# Patient Record
Sex: FEMALE | Race: WHITE | Employment: UNEMPLOYED | URBAN - METROPOLITAN AREA
[De-identification: names, ages, dates, MRNs, and addresses within clinical notes are randomized per-mention and may not be internally consistent; named-entity substitution may affect disease eponyms.]

---

## 2022-09-01 ENCOUNTER — OFFICE VISIT (OUTPATIENT)
Dept: ORTHOPEDIC SURGERY | Age: 42
End: 2022-09-01
Payer: COMMERCIAL

## 2022-09-01 VITALS — WEIGHT: 210 LBS | BODY MASS INDEX: 31.83 KG/M2 | HEIGHT: 68 IN

## 2022-09-01 DIAGNOSIS — M25.531 RIGHT WRIST PAIN: Primary | ICD-10-CM

## 2022-09-01 DIAGNOSIS — G56.01 ACUTE CARPAL TUNNEL SYNDROME OF RIGHT WRIST: ICD-10-CM

## 2022-09-01 PROCEDURE — 99203 OFFICE O/P NEW LOW 30 MIN: CPT | Performed by: ORTHOPAEDIC SURGERY

## 2022-09-01 RX ORDER — IBUPROFEN 600 MG/1
TABLET ORAL
COMMUNITY
Start: 2022-07-20

## 2022-09-01 RX ORDER — ROSUVASTATIN CALCIUM 10 MG/1
TABLET, COATED ORAL
COMMUNITY
Start: 2022-06-18

## 2022-09-01 RX ORDER — CHLORTHALIDONE 25 MG/1
TABLET ORAL
COMMUNITY
Start: 2022-08-16

## 2022-09-01 RX ORDER — DULOXETIN HYDROCHLORIDE 60 MG/1
CAPSULE, DELAYED RELEASE ORAL
COMMUNITY
Start: 2022-08-01

## 2022-09-01 RX ORDER — OMEPRAZOLE 40 MG/1
40 CAPSULE, DELAYED RELEASE ORAL DAILY
COMMUNITY
Start: 2021-12-16

## 2022-09-01 RX ORDER — OXYCODONE HYDROCHLORIDE 5 MG/1
TABLET ORAL
COMMUNITY
Start: 2022-07-20

## 2022-09-01 RX ORDER — OXCARBAZEPINE 150 MG/1
TABLET, FILM COATED ORAL
COMMUNITY
Start: 2022-08-22

## 2022-09-01 RX ORDER — VERAPAMIL HYDROCHLORIDE 360 MG/1
CAPSULE, DELAYED RELEASE PELLETS ORAL
COMMUNITY
Start: 2022-08-22

## 2022-09-01 RX ORDER — NARATRIPTAN 2.5 MG/1
TABLET ORAL
COMMUNITY
Start: 2022-07-24

## 2022-09-01 RX ORDER — SPIRONOLACTONE 50 MG/1
50 TABLET, FILM COATED ORAL 2 TIMES DAILY
COMMUNITY
Start: 2021-12-16

## 2022-09-01 RX ORDER — METHYLPREDNISOLONE 4 MG/1
TABLET ORAL
Qty: 1 KIT | Refills: 0 | Status: SHIPPED | OUTPATIENT
Start: 2022-09-01 | End: 2022-09-29 | Stop reason: ALTCHOICE

## 2022-09-01 RX ORDER — SIMETHICONE 125 MG
CAPSULE ORAL ONCE
COMMUNITY

## 2022-09-01 RX ORDER — DOXAZOSIN 2 MG/1
TABLET ORAL
COMMUNITY
Start: 2022-08-16

## 2022-09-01 RX ORDER — TRIAMCINOLONE ACETONIDE 1 MG/G
CREAM TOPICAL
COMMUNITY
Start: 2022-05-16

## 2022-09-01 RX ORDER — ACETAMINOPHEN 325 MG/1
650 TABLET ORAL EVERY 6 HOURS PRN
COMMUNITY
Start: 2022-07-20

## 2022-09-01 NOTE — PROGRESS NOTES
Yumiko Deluna is seen today for evaluation of right wrist pain. She has some intermittent right wrist pain over the last 2 years associated with computer work. She wears an over-the-counter carpal tunnel brace at night and that seems to help. Starting couple of weeks ago she began having more severe pain that would be as bad as 8 or 9 out of 10 with some activities such as washing her hands. She has pain on the palmar and ulnar aspect of the hand at night she gets numbness and tingling in her fingers. Ibuprofen seems to help. She takes Cymbalta for migraines. She is right-hand dominant but writes with her left hand. 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 She denies trauma. History: Patient's relevant past family, medical, and social history are reviewed as part of today's visit. ROS of pertinent positives and negatives as above; otherwise negative. General Exam:    Vitals: Height 5' 8\" (1.727 m), weight 210 lb (95.3 kg). Constitutional: Patient is adequately groomed with no evidence of malnutrition  Mental Status: The patient is oriented to time, place and person. The patient's mood and affect are appropriate. Gait:  Patient walks with normal gait and station. Lymphatic: The lymphatic examination bilaterally reveals all areas to be without enlargement or induration. Vascular: Examination reveals no swelling or calf tenderness. Peripheral pulses are palpable and 2+. Neurological: The patient has good coordination. There is no weakness or sensory deficit. Skin:    Head/Neck: inspection reveals no rashes, ulcerations or lesions. Trunk:  inspection reveals no rashes, ulcerations or lesions. Right Lower Extremity: inspection reveals no rashes, ulcerations or lesions. Left Lower Extremity: inspection reveals no rashes, ulcerations or lesions. Left wrist exam is benign. She has normal sensation with brisk capillary refill.   I cannot provoke any level of discomfort with range of motion, , or palpation. Right wrist has some mild discomfort over the ulnar aspect. Tinel's and Phalen's are negative median nerve compression is negative. She has normal flexor and extensor tendons with normal range of motion. She has brisk capillary refill. Three-view x-rays right wrist obtained today in the office and interpreted by me demonstrate:No   Bony abnormalities    Assessment: Nonspecific right wrist pain with complaints consistent with carpal tunnel although physical exam was unconvincing. Plan: We are going to start with a Medrol Dosepak, I am recommending she wear a carpal tunnel brace for most activities including sleep and we will obtain an EMG. Follow-up with me after the EMG.

## 2022-09-29 ENCOUNTER — OFFICE VISIT (OUTPATIENT)
Dept: ORTHOPEDIC SURGERY | Age: 42
End: 2022-09-29
Payer: COMMERCIAL

## 2022-09-29 VITALS — BODY MASS INDEX: 31.83 KG/M2 | WEIGHT: 210 LBS | HEIGHT: 68 IN

## 2022-09-29 DIAGNOSIS — M25.531 RIGHT WRIST PAIN: Primary | ICD-10-CM

## 2022-09-29 PROCEDURE — 99212 OFFICE O/P EST SF 10 MIN: CPT | Performed by: ORTHOPAEDIC SURGERY

## 2022-09-29 RX ORDER — MELOXICAM 15 MG/1
15 TABLET ORAL DAILY
Qty: 30 TABLET | Refills: 1 | Status: SHIPPED | OUTPATIENT
Start: 2022-09-29 | End: 2022-10-29

## 2022-09-29 NOTE — PROGRESS NOTES
Gustavo Baig returns today to follow-up her right wrist.  Medrol Dosepak was tremendously helpful. She is usually pain-free but sometimes has pain that is 2 or 3 out of 10. We obtained her EMG as well. General Exam:    Vitals: Height 5' 8\" (1.727 m), weight 210 lb (95.3 kg). Constitutional: Patient is adequately groomed with no evidence of malnutrition  Mental Status: The patient is oriented to time, place and person. The patient's mood and affect are appropriate. Right wrist today has some trace ulnar discomfort but no pain radially no pain on the palmar or dorsal surface. She has no pain with range of motion and no pain with . She has brisk capillary refill with normal sensation and normal radial, median, and ulnar nerve function. EMG of the right upper extremity is reviewed and it shows no evidence of neuropathy, plexopathy, or radiculopathy. Assessment: Non specific but improving right wrist pain. Plan: Prescription for meloxicam was provided. She will not mix this with ibuprofen or Aleve. She will take it with food. Hopefully this will provide complete resolution. Follow-up with me on an as-needed basis.

## 2022-12-03 DIAGNOSIS — M25.531 RIGHT WRIST PAIN: ICD-10-CM

## 2022-12-05 RX ORDER — MELOXICAM 15 MG/1
15 TABLET ORAL DAILY
Qty: 30 TABLET | Refills: 1 | Status: SHIPPED | OUTPATIENT
Start: 2022-12-05 | End: 2023-01-04

## 2023-02-10 DIAGNOSIS — M25.531 RIGHT WRIST PAIN: ICD-10-CM

## 2023-02-10 RX ORDER — MELOXICAM 15 MG/1
15 TABLET ORAL DAILY
Qty: 30 TABLET | Refills: 1 | OUTPATIENT
Start: 2023-02-10 | End: 2023-03-12

## 2023-12-18 NOTE — PROGRESS NOTES
Maria M Pendleton    Age 44 y.o.    female    1980    MRN [id number]    12/27/2023  Arrival Time_____________  OR Time____________30 Georjean Millin     Procedure(s):  ESOPHAGOGASTRODUODENOSCOPY                      General    Surgeon(s):  Arianne Moralez, MD       Phone [phone] ([place])     [facility]  Cell         Work  _____________________________________________________________________  _____________________________________________________________________  _____________________________________________________________________  _____________________________________________________________________  _____________________________________________________________________    PCP _____________________________ Phone_________________     H&P  ________________  Bringing      Chart              Epic      DOS      Called________  EKG ________________   Bringing      Chart              Epic      DOS      Called________  LABS________________   Bringing     Chart              Epic      DOS      Called________  Cardiac Clearance ______ Bringing      Chart              Epic      DOS      Called________  Pulmonary Clearance____ Bringing      Chart              Epic      DOS      Called________    Cardiologist________________________ Phone___________________________  Pulmonologist_______________________Phone___________________________    ? Advance Directives   ? Christian concerns / Waiver on Chart            PAT Communications________________  ? Pre-op Instructions Given [address]          _________________________________  ? Directions to Surgery Center                          _________________________________  ? Transportation Home_______________      __________________________________  ?  Crutches/Walker__________________        __________________________________    ________Pre-op Orders   _______Transcribed    _______Wt.  ________Pharmacy          _______SCD       ______SHANA Oviedo

## 2023-12-20 NOTE — PROGRESS NOTES
Date and time of surgery :12/27/23              Arrival Time:  0800     Bring Picture ID and insurance card. Please wear simple, loose fitting clothing to the hospital.   [place] not bring valuables (money, credit cards, checkbooks, etc.)   Do not wear any makeup (including  eye makeup) and no nail polish or artificial nails on your fingers or toes. DO NOT wear any jewelry or piercings on day of surgery. All body piercing jewelry must be removed. If you have dentures, they will be removed before going to the OR; we will provide you a container. If you wear contact lenses or glasses, they will be removed; please bring a case for them. Shower the evening before or morning of surgery with antibacterial soap. Nothing to eat or drink after midnight the day before surgery. You may brush your teeth and gargle the morning of surgery. DO NOT SWALLOW WATER. Do not take any morning meds the day of your surgery. Aspirin, Ibuprofen, Advil, Naproxen, Vitamin E and other Anti-inflammatory products and supplements should be stopped for 5 -7days before surgery or as directed by your physician. Do not smoke or drink any alcoholic beverages 24 hours prior to surgery. This includes NA Beer. Refrain from the usage of any recreational drugs, including non-prescribed prescription drugs. You MUST plan for a responsible adult to stay on site while you are here and take you home after your surgery. You will not be allowed to leave alone or drive yourself home. It is strongly suggested someone stay with you the first 24 hrs. Your surgery will be cancelled if you do not have a ride home. To help prevent infection, change your sheets the night before surgery. If you  have a Living Will and Durable Power of  for Healthcare, please bring in a copy. Notify your Surgeon if you develop any illness between now and time of surgery.  Cough, cold, fever, sore throat, nausea, vomiting, etc.  Please notify your surgeon if you

## 2023-12-26 ENCOUNTER — ANESTHESIA EVENT (OUTPATIENT)
Dept: ENDOSCOPY | Age: 43
End: 2023-12-26
Payer: COMMERCIAL

## 2023-12-27 ENCOUNTER — ANESTHESIA (OUTPATIENT)
Dept: ENDOSCOPY | Age: 43
End: 2023-12-27
Payer: COMMERCIAL

## 2023-12-27 ENCOUNTER — HOSPITAL ENCOUNTER (OUTPATIENT)
Age: 43
Setting detail: OUTPATIENT SURGERY
Discharge: HOME | End: 2023-12-27
Attending: INTERNAL MEDICINE | Admitting: INTERNAL MEDICINE
Payer: COMMERCIAL

## 2023-12-27 VITALS
BODY MASS INDEX: 31.84 KG/M2 | OXYGEN SATURATION: 97 % | DIASTOLIC BLOOD PRESSURE: 77 MMHG | TEMPERATURE: 97 F | WEIGHT: 215 LBS | HEART RATE: 70 BPM | RESPIRATION RATE: 18 BRPM | HEIGHT: 69 IN | SYSTOLIC BLOOD PRESSURE: 130 MMHG

## 2023-12-27 DIAGNOSIS — K31.84 GASTROPARESIS: Primary | ICD-10-CM

## 2023-12-27 PROCEDURE — 7100000010 HC PHASE II RECOVERY - FIRST 15 MIN: Performed by: INTERNAL MEDICINE

## 2023-12-27 PROCEDURE — 2500000003 HC RX 250 WO HCPCS: Performed by: NURSE ANESTHETIST, CERTIFIED REGISTERED

## 2023-12-27 PROCEDURE — 3700000001 HC ADD 15 MINUTES (ANESTHESIA): Performed by: INTERNAL MEDICINE

## 2023-12-27 PROCEDURE — 7100000011 HC PHASE II RECOVERY - ADDTL 15 MIN: Performed by: INTERNAL MEDICINE

## 2023-12-27 PROCEDURE — 2709999900 HC NON-CHARGEABLE SUPPLY: Performed by: INTERNAL MEDICINE

## 2023-12-27 PROCEDURE — 6360000002 HC RX W HCPCS: Performed by: NURSE ANESTHETIST, CERTIFIED REGISTERED

## 2023-12-27 PROCEDURE — 3609017100 HC EGD: Performed by: INTERNAL MEDICINE

## 2023-12-27 PROCEDURE — 3700000000 HC ANESTHESIA ATTENDED CARE: Performed by: INTERNAL MEDICINE

## 2023-12-27 PROCEDURE — 2580000003 HC RX 258: Performed by: ANESTHESIOLOGY

## 2023-12-27 RX ORDER — SODIUM CHLORIDE 0.9 % (FLUSH) 0.9 %
5-40 SYRINGE (ML) INJECTION EVERY 12 HOURS SCHEDULED
Status: DISCONTINUED | OUTPATIENT
Start: 2023-12-27 | End: 2023-12-27 | Stop reason: HOSPADM

## 2023-12-27 RX ORDER — SODIUM CHLORIDE 0.9 % (FLUSH) 0.9 %
5-40 SYRINGE (ML) INJECTION PRN
Status: DISCONTINUED | OUTPATIENT
Start: 2023-12-27 | End: 2023-12-27 | Stop reason: HOSPADM

## 2023-12-27 RX ORDER — LIDOCAINE HYDROCHLORIDE 20 MG/ML
INJECTION, SOLUTION EPIDURAL; INFILTRATION; INTRACAUDAL; PERINEURAL PRN
Status: DISCONTINUED | OUTPATIENT
Start: 2023-12-27 | End: 2023-12-27 | Stop reason: SDUPTHER

## 2023-12-27 RX ORDER — SODIUM CHLORIDE, SODIUM LACTATE, POTASSIUM CHLORIDE, CALCIUM CHLORIDE 600; 310; 30; 20 MG/100ML; MG/100ML; MG/100ML; MG/100ML
INJECTION, SOLUTION INTRAVENOUS CONTINUOUS
Status: DISCONTINUED | OUTPATIENT
Start: 2023-12-27 | End: 2023-12-27 | Stop reason: HOSPADM

## 2023-12-27 RX ORDER — LIDOCAINE HYDROCHLORIDE 10 MG/ML
0.3 INJECTION, SOLUTION EPIDURAL; INFILTRATION; INTRACAUDAL; PERINEURAL
Status: DISCONTINUED | OUTPATIENT
Start: 2023-12-27 | End: 2023-12-27 | Stop reason: HOSPADM

## 2023-12-27 RX ORDER — PROPOFOL 10 MG/ML
INJECTION, EMULSION INTRAVENOUS PRN
Status: DISCONTINUED | OUTPATIENT
Start: 2023-12-27 | End: 2023-12-27 | Stop reason: SDUPTHER

## 2023-12-27 RX ORDER — SODIUM CHLORIDE 9 MG/ML
INJECTION, SOLUTION INTRAVENOUS PRN
Status: DISCONTINUED | OUTPATIENT
Start: 2023-12-27 | End: 2023-12-27 | Stop reason: HOSPADM

## 2023-12-27 RX ADMIN — PROPOFOL 150 MG: 10 INJECTION, EMULSION INTRAVENOUS at 08:57

## 2023-12-27 RX ADMIN — SODIUM CHLORIDE, POTASSIUM CHLORIDE, SODIUM LACTATE AND CALCIUM CHLORIDE: 600; 310; 30; 20 INJECTION, SOLUTION INTRAVENOUS at 08:35

## 2023-12-27 RX ADMIN — PROPOFOL 25 MG: 10 INJECTION, EMULSION INTRAVENOUS at 09:00

## 2023-12-27 RX ADMIN — LIDOCAINE HYDROCHLORIDE 15 MG: 20 INJECTION, SOLUTION EPIDURAL; INFILTRATION; INTRACAUDAL at 08:58

## 2023-12-27 RX ADMIN — LIDOCAINE HYDROCHLORIDE 15 MG: 20 INJECTION, SOLUTION EPIDURAL; INFILTRATION; INTRACAUDAL at 09:00

## 2023-12-27 RX ADMIN — PROPOFOL 25 MG: 10 INJECTION, EMULSION INTRAVENOUS at 08:58

## 2023-12-27 RX ADMIN — LIDOCAINE HYDROCHLORIDE 70 MG: 20 INJECTION, SOLUTION EPIDURAL; INFILTRATION; INTRACAUDAL at 08:57

## 2023-12-27 NOTE — DISCHARGE INSTRUCTIONS
PATIENT INSTRUCTIONS  POST-SEDATION        IMMEDIATELY FOLLOWING PROCEDURE:    Do not drive or operate machinery for the first twenty four hours after surgery. Do not make any important decisions for twenty four hours after surgery or while taking narcotic pain medications or sedatives. You should NOT BE LEFT UNATTENDED OR ALONE. A responsible adult should be with you for the rest of the day of your procedure and also during the night for your protection and safety. You may experience some light headedness. Rest at home with activity as tolerated. You may not need to go to bed, but it is important to rest for the next 24 hours. You should not engage in athletic sports such as basketball, volleyball, jogging, skating, or activities requiring refined motor skills for 24 hours. If you develop intractable nausea and vomiting or a severe headache please notify your doctor immediately. You are not expected to have any fever, but if you feel warm, take your temperature. If you have a fever 101 degrees or higher, call your doctor. If you have had an Endoscopy:   *Eat lightly for your first meal and gradually resume your normal / prescribed diet. DO NOT eat or drink until your gag reflex returns. *If you have a sore throat you may use lozenges, or salt water gargles. ONCE YOU ARE HOME, IF YOU SHOULD HAVE:  Difficulty in breathing, persistent nausea or vomiting, bleeding you feel is excessive, or pain that is unusual, increased abdominal bloating, or any swelling, fever / chills, call your physician. If you cannot contact your physician, but feel that your signs and symptoms need a physician's attention, go to the Emergency Department. FOLLOW-UP:    Please follow up with your Primary Care Provider as scheduled or needed. Call Dr. Sarath Veloz MD if there are any GI concerns. [phone]      You may be receiving a follow up phone call to ask about your care.          Upper GI Endoscopy:

## 2023-12-27 NOTE — H&P
[place]   Pre-operative History and Physical    Patient: Sarah Davis  : 1980  Acct#:     HISTORY OF PRESENT ILLNESS:    The patient is a 44 y.o. female who presents for esophageal dysphagia    Indications: Esophageal dysphagia    Past Medical History:        Diagnosis Date    AC (acromioclavicular) joint bone spurs     back    Anxiety     Bronchitis     Depression     Hyperlipidemia     Hypertension     Migraine     Occipital neuralgia     Sleep apnea     c pap      Past Surgical History:        Procedure Laterality Date    BLADDER SUSPENSION      HYSTERECTOMY (CERVIX STATUS UNKNOWN)      NASAL SEPTUM SURGERY      SINUS SURGERY        Medications Prior to Admission:   No current facility-administered medications on file prior to encounter. Current Outpatient Medications on File Prior to Encounter   Medication Sig Dispense Refill    meloxicam (MOBIC) 15 MG tablet TAKE 1 TABLET BY MOUTH DAILY TAKE ONE TAB 15 MG BY MOUTH DAILY WITH FOOD (Patient taking differently: Take 1 tablet by mouth as needed Take one tab 15 mg by mouth daily with food) 30 tablet 1    chlorthalidone (HYGROTON) 25 MG tablet TAKE 1 TABLET BY MOUTH EVERY DAY      acetaminophen (TYLENOL) 325 MG tablet Take 2 tablets by mouth every 6 hours as needed      Cholecalciferol 50 MCG ( UT) TABS Take 1 tablet by mouth daily      doxazosin (CARDURA) 2 MG tablet TAKE 1 TABLET (2 MG) BY MOUTH EVERY EVENING. DULoxetine (CYMBALTA) 60 MG extended release capsule Take 1 capsule by mouth daily      ibuprofen (ADVIL;MOTRIN) 600 MG tablet       naratriptan (AMERGE) 2.5 MG tablet TAKE 1 TABLET BY MOUTH 1 TIME AT ONSET OF HEADACHE. MAY REPEAT AFTER 4 HOURS.  NOT MORE THAN 2 PER DAY AND 4 PER WEEK      omeprazole (PRILOSEC) 40 MG delayed release capsule Take 1 capsule by mouth daily      OXcarbazepine (TRILEPTAL) 150 MG tablet Take 2 tablets by mouth 2 times daily      rosuvastatin (CRESTOR) 10 MG tablet TAKE 1 TABLET BY MOUTH EVERY DAY

## 2023-12-27 NOTE — ANESTHESIA POSTPROCEDURE EVALUATION
Department of Anesthesiology  Postprocedure Note    Patient: Rod Dave  MRN: [id number]  YOB: 1980  Date of evaluation: 12/27/2023    Procedure Summary       Date: 12/27/23 Room / Location: [place] [address] / [facility]    Anesthesia Start: 3264 Anesthesia Stop: 3992    Procedure: ESOPHAGOGASTRODUODENOSCOPY Diagnosis:       Esophageal dysphagia      (Esophageal dysphagia [R13.19])    Surgeons: Lenny Weiner MD Responsible Provider: Spencer Noe MD    Anesthesia Type: MAC ASA Status: 2            Anesthesia Type: No value filed. Andres Phase I: Andres Score: 10    Andres Phase II: Andres Score: 9    Anesthesia Post Evaluation    Patient location during evaluation: PACU  Level of consciousness: awake  Airway patency: patent  Nausea & Vomiting: no nausea  Cardiovascular status: blood pressure returned to baseline  Respiratory status: acceptable  Hydration status: euvolemic  Pain management: adequate    No notable events documented.

## 2023-12-27 NOTE — PROCEDURES
Endoscopy Note    Patient: Santa Foote  : 1980      Procedure: Esophagogastroduodenoscopy     Date:  2023     Surgeon:  John Givens MD     Referring Physician:  Jeff Pretty MD      History:      INDICATION: Esophageal dysphagia     ASA: 2  SEDATION: MAC         Operative Surgeon: John Givens MD  Scope Type: Gastroscope      Preoperative Diagnosis: Esophageal dysphagia  Postoperative Diagnosis: Retained food    Procedure Performed: EGD    Procedure Details:    With the patient in left lateral position the endoscope was passed through the hypopharynx into the esophagus. The scope as then passed through the esophagus to the second portion of duodenum. All visualized portions were carefully inspected. The gastric air was suctioned and the scope as removed. Patient tolerated the procedure well. Findings and maneuvers are discussed below. Complications:  None  Estimated Blood Loss: minimal to none    Post Operative Findings:   Esophagus: Widely patent no strictures Friend's or esophagitis seen  Stomach: Rather large degree of retained food in the gastric body and debris throughout the gastric body. Duodenum: Normal    Plan: No dilation today due to retained food in the gastric body and risk of aspiration. She may have worsening reflux and difficult to control secondary to poor gastric emptying. Will order a gastric emptying study going forward. Signed By: MD John Huddleston MD,   [facility]  [phone]    Please note that some or all of this record was generated using voice recognition software. If there are any questions about the content of this document, please contact the author as some errors in translation may have occurred.

## 2023-12-27 NOTE — PROGRESS NOTES
Discharge instructions reviewed with patient/responsible adult and understanding verbalized. Discharge instructions copies given. Discharge criteria met per hospital policy. Patient discharged home with belongings.

## 2024-01-18 ENCOUNTER — HOSPITAL ENCOUNTER (OUTPATIENT)
Dept: NUCLEAR MEDICINE | Age: 44
Discharge: HOME | End: 2024-01-18
Attending: INTERNAL MEDICINE
Payer: COMMERCIAL

## 2024-01-18 DIAGNOSIS — K31.84 GASTROPARESIS: ICD-10-CM

## 2024-01-18 PROCEDURE — 78264 GASTRIC EMPTYING IMG STUDY: CPT

## 2024-01-18 PROCEDURE — A9541 TC99M SULFUR COLLOID: HCPCS | Performed by: INTERNAL MEDICINE

## 2024-01-18 PROCEDURE — 3430000000 HC RX DIAGNOSTIC RADIOPHARMACEUTICAL: Performed by: INTERNAL MEDICINE

## 2024-01-18 RX ADMIN — Medication 1 MILLICURIE: at 10:04

## 2024-02-21 ENCOUNTER — ANESTHESIA EVENT (OUTPATIENT)
Dept: ENDOSCOPY | Age: 44
End: 2024-02-21
Payer: COMMERCIAL

## 2024-03-08 ENCOUNTER — HOSPITAL ENCOUNTER (OUTPATIENT)
Age: 44
Setting detail: OUTPATIENT SURGERY
Discharge: HOME | End: 2024-03-08
Attending: INTERNAL MEDICINE | Admitting: INTERNAL MEDICINE
Payer: COMMERCIAL

## 2024-03-08 ENCOUNTER — ANESTHESIA (OUTPATIENT)
Dept: ENDOSCOPY | Age: 44
End: 2024-03-08
Payer: COMMERCIAL

## 2024-03-08 VITALS
SYSTOLIC BLOOD PRESSURE: 128 MMHG | BODY MASS INDEX: 28.88 KG/M2 | HEART RATE: 81 BPM | RESPIRATION RATE: 16 BRPM | WEIGHT: 195 LBS | TEMPERATURE: 97.7 F | HEIGHT: 69 IN | DIASTOLIC BLOOD PRESSURE: 86 MMHG | OXYGEN SATURATION: 97 %

## 2024-03-08 DIAGNOSIS — K21.9 GASTROESOPHAGEAL REFLUX DISEASE, UNSPECIFIED WHETHER ESOPHAGITIS PRESENT: ICD-10-CM

## 2024-03-08 PROCEDURE — 3609012400 HC EGD TRANSORAL BIOPSY SINGLE/MULTIPLE: Performed by: INTERNAL MEDICINE

## 2024-03-08 PROCEDURE — 3700000000 HC ANESTHESIA ATTENDED CARE: Performed by: INTERNAL MEDICINE

## 2024-03-08 PROCEDURE — 7100000011 HC PHASE II RECOVERY - ADDTL 15 MIN: Performed by: INTERNAL MEDICINE

## 2024-03-08 PROCEDURE — 6360000002 HC RX W HCPCS: Performed by: NURSE ANESTHETIST, CERTIFIED REGISTERED

## 2024-03-08 PROCEDURE — 88305 TISSUE EXAM BY PATHOLOGIST: CPT

## 2024-03-08 PROCEDURE — 2709999900 HC NON-CHARGEABLE SUPPLY: Performed by: INTERNAL MEDICINE

## 2024-03-08 PROCEDURE — 2580000003 HC RX 258: Performed by: NURSE ANESTHETIST, CERTIFIED REGISTERED

## 2024-03-08 PROCEDURE — 2500000003 HC RX 250 WO HCPCS: Performed by: NURSE ANESTHETIST, CERTIFIED REGISTERED

## 2024-03-08 PROCEDURE — 7100000010 HC PHASE II RECOVERY - FIRST 15 MIN: Performed by: INTERNAL MEDICINE

## 2024-03-08 RX ORDER — OXYCODONE HYDROCHLORIDE 5 MG/1
10 TABLET ORAL PRN
Status: CANCELLED | OUTPATIENT
Start: 2024-03-08 | End: 2024-03-08

## 2024-03-08 RX ORDER — SODIUM CHLORIDE 9 MG/ML
INJECTION, SOLUTION INTRAVENOUS PRN
Status: CANCELLED | OUTPATIENT
Start: 2024-03-08

## 2024-03-08 RX ORDER — SODIUM CHLORIDE 0.9 % (FLUSH) 0.9 %
5-40 SYRINGE (ML) INJECTION EVERY 12 HOURS SCHEDULED
Status: DISCONTINUED | OUTPATIENT
Start: 2024-03-08 | End: 2024-03-08 | Stop reason: HOSPADM

## 2024-03-08 RX ORDER — NALOXONE HYDROCHLORIDE 0.4 MG/ML
INJECTION, SOLUTION INTRAMUSCULAR; INTRAVENOUS; SUBCUTANEOUS PRN
Status: CANCELLED | OUTPATIENT
Start: 2024-03-08

## 2024-03-08 RX ORDER — SODIUM CHLORIDE 9 MG/ML
INJECTION, SOLUTION INTRAVENOUS PRN
Status: DISCONTINUED | OUTPATIENT
Start: 2024-03-08 | End: 2024-03-08 | Stop reason: HOSPADM

## 2024-03-08 RX ORDER — SODIUM CHLORIDE, SODIUM LACTATE, POTASSIUM CHLORIDE, CALCIUM CHLORIDE 600; 310; 30; 20 MG/100ML; MG/100ML; MG/100ML; MG/100ML
INJECTION, SOLUTION INTRAVENOUS CONTINUOUS PRN
Status: DISCONTINUED | OUTPATIENT
Start: 2024-03-08 | End: 2024-03-08 | Stop reason: SDUPTHER

## 2024-03-08 RX ORDER — OXYCODONE HYDROCHLORIDE 5 MG/1
5 TABLET ORAL PRN
Status: CANCELLED | OUTPATIENT
Start: 2024-03-08 | End: 2024-03-08

## 2024-03-08 RX ORDER — SODIUM CHLORIDE, SODIUM LACTATE, POTASSIUM CHLORIDE, CALCIUM CHLORIDE 600; 310; 30; 20 MG/100ML; MG/100ML; MG/100ML; MG/100ML
INJECTION, SOLUTION INTRAVENOUS CONTINUOUS
Status: DISCONTINUED | OUTPATIENT
Start: 2024-03-08 | End: 2024-03-08 | Stop reason: HOSPADM

## 2024-03-08 RX ORDER — MEPERIDINE HYDROCHLORIDE 25 MG/ML
12.5 INJECTION INTRAMUSCULAR; INTRAVENOUS; SUBCUTANEOUS EVERY 5 MIN PRN
Status: CANCELLED | OUTPATIENT
Start: 2024-03-08

## 2024-03-08 RX ORDER — FAMOTIDINE 10 MG/ML
20 INJECTION, SOLUTION INTRAVENOUS ONCE
Status: DISCONTINUED | OUTPATIENT
Start: 2024-03-08 | End: 2024-03-08 | Stop reason: HOSPADM

## 2024-03-08 RX ORDER — SODIUM CHLORIDE 0.9 % (FLUSH) 0.9 %
5-40 SYRINGE (ML) INJECTION EVERY 12 HOURS SCHEDULED
Status: CANCELLED | OUTPATIENT
Start: 2024-03-08

## 2024-03-08 RX ORDER — SODIUM CHLORIDE 0.9 % (FLUSH) 0.9 %
5-40 SYRINGE (ML) INJECTION PRN
Status: CANCELLED | OUTPATIENT
Start: 2024-03-08

## 2024-03-08 RX ORDER — SODIUM CHLORIDE 0.9 % (FLUSH) 0.9 %
5-40 SYRINGE (ML) INJECTION PRN
Status: DISCONTINUED | OUTPATIENT
Start: 2024-03-08 | End: 2024-03-08 | Stop reason: HOSPADM

## 2024-03-08 RX ORDER — ONDANSETRON 2 MG/ML
4 INJECTION INTRAMUSCULAR; INTRAVENOUS
Status: CANCELLED | OUTPATIENT
Start: 2024-03-08 | End: 2024-03-09

## 2024-03-08 RX ORDER — LIDOCAINE HYDROCHLORIDE 20 MG/ML
INJECTION, SOLUTION EPIDURAL; INFILTRATION; INTRACAUDAL; PERINEURAL PRN
Status: DISCONTINUED | OUTPATIENT
Start: 2024-03-08 | End: 2024-03-08 | Stop reason: SDUPTHER

## 2024-03-08 RX ORDER — PROPOFOL 10 MG/ML
INJECTION, EMULSION INTRAVENOUS PRN
Status: DISCONTINUED | OUTPATIENT
Start: 2024-03-08 | End: 2024-03-08 | Stop reason: SDUPTHER

## 2024-03-08 RX ADMIN — SODIUM CHLORIDE, POTASSIUM CHLORIDE, SODIUM LACTATE AND CALCIUM CHLORIDE: 600; 310; 30; 20 INJECTION, SOLUTION INTRAVENOUS at 09:41

## 2024-03-08 RX ADMIN — LIDOCAINE HYDROCHLORIDE 60 MG: 20 INJECTION, SOLUTION EPIDURAL; INFILTRATION; INTRACAUDAL; PERINEURAL at 09:46

## 2024-03-08 RX ADMIN — PROPOFOL 200 MG: 10 INJECTION, EMULSION INTRAVENOUS at 09:46

## 2024-03-08 ASSESSMENT — PAIN - FUNCTIONAL ASSESSMENT: PAIN_FUNCTIONAL_ASSESSMENT: NONE - DENIES PAIN

## 2024-03-08 ASSESSMENT — LIFESTYLE VARIABLES: SMOKING_STATUS: 0

## 2024-03-08 ASSESSMENT — ENCOUNTER SYMPTOMS: SHORTNESS OF BREATH: 0

## 2024-03-08 NOTE — H&P
[facility]   Pre-operative History and Physical    Patient: Gifty Marcum  : 1980  Acct#:     HISTORY OF PRESENT ILLNESS:    The patient is a 43 y.o. female who presents for gastroparesis    Indications: Gastroparesis    Past Medical History:        Diagnosis Date    AC (acromioclavicular) joint bone spurs     back    Anxiety     Bronchitis     Depression     Hyperlipidemia     Hypertension     Migraine     Occipital neuralgia     Sleep apnea     c pap      Past Surgical History:        Procedure Laterality Date    BLADDER SUSPENSION      HYSTERECTOMY (CERVIX STATUS UNKNOWN)      NASAL SEPTUM SURGERY      SINUS SURGERY      UPPER GASTROINTESTINAL ENDOSCOPY N/A 2023    ESOPHAGOGASTRODUODENOSCOPY performed by Jelani Centeno MD at [facility] ENDOSCOPY      Medications Prior to Admission:   No current facility-administered medications on file prior to encounter.     Current Outpatient Medications on File Prior to Encounter   Medication Sig Dispense Refill    meloxicam (MOBIC) 15 MG tablet TAKE 1 TABLET BY MOUTH DAILY TAKE ONE TAB 15 MG BY MOUTH DAILY WITH FOOD (Patient taking differently: Take 1 tablet by mouth as needed Take one tab 15 mg by mouth daily with food) 30 tablet 1    chlorthalidone (HYGROTON) 25 MG tablet TAKE 1 TABLET BY MOUTH EVERY DAY      Cholecalciferol 50 MCG (2000) TABS Take 1 tablet by mouth daily      doxazosin (CARDURA) 2 MG tablet Take 1 tablet by mouth nightly      DULoxetine (CYMBALTA) 60 MG extended release capsule Take 1 capsule by mouth daily      naratriptan (AMERGE) 2.5 MG tablet TAKE 1 TABLET BY MOUTH 1 TIME AT ONSET OF HEADACHE. MAY REPEAT AFTER 4 HOURS. NOT MORE THAN 2 PER DAY AND 4 PER WEEK      omeprazole (PRILOSEC) 40 MG delayed release capsule Take 1 capsule by mouth in the morning and at bedtime      OXcarbazepine (TRILEPTAL) 150 MG tablet Take 1 tablet by mouth 2 times daily      rosuvastatin (CRESTOR) 10 MG tablet TAKE 1 TABLET BY MOUTH EVERY DAY IN  THE EVENING      spironolactone (ALDACTONE) 50 MG tablet Take 2 tablets by mouth 2 times daily      verapamil (VERELAN) 360 MG extended release capsule Take 1 capsule by mouth daily          Allergies:  Lisinopril, Sulfa antibiotics, and Sulfamethoxazole-trimethoprim    Social History:   Social History     Socioeconomic History    Marital status:      Spouse name: Not on file    Number of children: Not on file    Years of education: Not on file    Highest education level: Not on file   Occupational History    Not on file   Tobacco Use    Smoking status: Former     Types: Cigarettes    Smokeless tobacco: Never   Vaping Use    Vaping Use: Never used   Substance and Sexual Activity    Alcohol use: Yes     Alcohol/week: 6.0 - 12.0 standard drinks of alcohol     Types: 3 - 6 Glasses of wine, 3 - 6 Shots of liquor per week     Comment: socially    Drug use: Never    Sexual activity: Not on file   Other Topics Concern    Not on file   Social History Narrative    Not on file     Social Determinants of Health     Financial Resource Strain: Not on file   Food Insecurity: Not on file   Transportation Needs: Not on file   Physical Activity: Not on file   Stress: Not on file   Social Connections: Not on file   Intimate Partner Violence: Not on file   Housing Stability: Not on file      Family History:       Problem Relation Age of Onset    Diabetes Mother     Breast Cancer Mother     Heart Disease Father     Stroke Father     Dementia Father         PHYSICAL EXAM:      /87   Pulse 81   Temp 97.9 °F (36.6 °C) (Infrared)   Resp 18   Ht 1.74 m (5' 8.5\")   Wt 88.5 kg (195 lb)   SpO2 98%   BMI 29.22 kg/m²  I        Heart:  Normal apical impulse, regular rate and rhythm, normal S1 and S2, no S3 or S4, and no murmur noted    Lungs:  No increased work of breathing, good air exchange, clear to auscultation bilaterally, no crackles or wheezing    Abdomen:  No scars, normal bowel sounds, soft, non-distended, non-tender,

## 2024-03-08 NOTE — PROGRESS NOTES
Pt arrived to PACU from ENDO in stable condition. Report received from Trinh METZGER and Hua BOND. Phase II. Care of pt transferred at this time. Pt  placed on cont pulse ox and BP. Pt arrived to unit without any oxygen requirements. SPO2 96% on RA.

## 2024-03-08 NOTE — PROGRESS NOTES
Discharge instructions explained in detail at bedside to  both pt and pts  [facility]. Pt and  received copy of discharge instructions. Pt  and  deny having any questions about discharge.

## 2024-03-08 NOTE — PROCEDURES
Endoscopy Note    Patient: Gifty Marcum  : 1980      Procedure: Esophagogastroduodenoscopy with bx    Date:  3/8/2024     Surgeon:  Jelani Centeno MD     Referring Physician:  Ofelia Tejada MD      History:      INDICATION: gastroparesis      ASA: 2  SEDATION: MAC         Operative Surgeon: Jelani Centeno MD  Scope Type: gastroscope      Preoperative Diagnosis: gastroparesis   Postoperative Diagnosis: gastritis     Procedure Performed: EGD    Procedure Details:    With the patient in left lateral position the endoscope was passed through the hypopharynx into the esophagus. The scope as then passed through the esophagus to the second portion of the duodenum. All visualized portions were carefully inspected. The gastric air was suctioned and the scope as removed. Patient tolerated the procedure well. Findings and maneuvers are discussed below.      Complications:  None  Estimated Blood Loss: minimal to none    Post Operative Findings:   Esophagus: thickening no Barretts or stricture   Stomach: gastritis biopsied  Duodenum: normal     Plan: Follow up biopsies. Gimoti vs motegrity as outpatient         Signed By: MD Jelani Villanueva MD,   [facility]  [phone]    Please note that some or all of this record was generated using voice recognition software. If there are any questions about the content of this document, please contact the author as some errors in translation may have occurred.

## 2024-03-08 NOTE — DISCHARGE INSTRUCTIONS
PATIENT INSTRUCTIONS  POST-SEDATION    Gifty Marcum          IMMEDIATELY FOLLOWING PROCEDURE:    Do not drive or operate machinery for the first twenty four hours after surgery.     Do not make any important decisions for twenty four hours after surgery or while taking narcotic pain medications or sedatives.     You should NOT BE LEFT UNATTENDED OR ALONE. A responsible adult should be with you for the rest of the day of your procedure and also during the night for your protection and safety.    You may experience some light headedness. Rest at home with activity as tolerated. You may not need to go to bed, but it is important to rest for the next 24 hours. You should not engage in athletic sports such as basketball, volleyball, jogging, skating, or activities requiring refined motor skills for 24 hours.   If you develop intractable nausea and vomiting or a severe headache please notify your doctor immediately.   You are not expected to have any fever, but if you feel warm, take your temperature. If you have a fever 101 degrees or higher, call your doctor.     If you have had an Endoscopy:   *Eat lightly for your first meal and gradually resume your normal / prescribed diet. DO NOT eat or drink until your gag reflex returns.   *If you have a sore throat you may use lozenges, or salt water gargles.       ONCE YOU ARE HOME, IF YOU SHOULD HAVE:  Difficulty in breathing, persistent nausea or vomiting, bleeding you feel is excessive, or pain that is unusual, increased abdominal bloating, or any swelling, fever / chills, call your physician. If you cannot contact your physician, but feel that your signs and symptoms need a physician's attention, go to the Emergency Department.      FOLLOW-UP:    Please follow up with Dr. Tejada as scheduled or needed.    Dr. Centeno's office will call you with the biopsy findings.  Call Dr. Centeno if there are any GI concerns & follow-up as needed. [phone]

## 2024-03-08 NOTE — PROGRESS NOTES
IV x 1 removed per discharge hemostasis achieved, dressing applied, no complications noted. Patient denies further needs. Pt transported to private car via wheel chair. Vitals per doc flow, pt pt  [facility] assumes responsibility.

## 2024-03-08 NOTE — ANESTHESIA PRE PROCEDURE
Department of Anesthesiology  Preprocedure Note       Name:  Gifty Marcum   Age:  43 y.o.  :  1980                                          MRN:  [id number]         Date:  3/8/2024      Surgeon: Surgeon(s):  Jelani Centeno MD    Procedure: Procedure(s):  ESOPHAGOGASTRODUODENOSCOPY    Medications prior to admission:   Prior to Admission medications    Medication Sig Start Date End Date Taking? Authorizing Provider   meloxicam (MOBIC) 15 MG tablet TAKE 1 TABLET BY MOUTH DAILY TAKE ONE TAB 15 MG BY MOUTH DAILY WITH FOOD  Patient taking differently: Take 1 tablet by mouth as needed Take one tab 15 mg by mouth daily with food 22  Clemente De Souza MD   chlorthalidone (HYGROTON) 25 MG tablet TAKE 1 TABLET BY MOUTH EVERY DAY 22   Cecilia Austin MD   Cholecalciferol 50 MCG (2000) TABS Take 1 tablet by mouth daily    Cecilia Austin MD   doxazosin (CARDURA) 2 MG tablet Take 1 tablet by mouth nightly 22   Cecilia Austin MD   DULoxetine (CYMBALTA) 60 MG extended release capsule Take 1 capsule by mouth daily 22   Cecilia Austin MD   naratriptan (AMERGE) 2.5 MG tablet TAKE 1 TABLET BY MOUTH 1 TIME AT ONSET OF HEADACHE. MAY REPEAT AFTER 4 HOURS. NOT MORE THAN 2 PER DAY AND 4 PER WEEK 22   Cecilia Austin MD   omeprazole (PRILOSEC) 40 MG delayed release capsule Take 1 capsule by mouth in the morning and at bedtime 21   Cecilia Austin MD   OXcarbazepine (TRILEPTAL) 150 MG tablet Take 1 tablet by mouth 2 times daily 22   Cecilia Austin MD   rosuvastatin (CRESTOR) 10 MG tablet TAKE 1 TABLET BY MOUTH EVERY DAY IN THE EVENING 22   Cecilia Austin MD   spironolactone (ALDACTONE) 50 MG tablet Take 2 tablets by mouth 2 times daily 21   Cecilia Austin MD   verapamil (VERELAN) 360 MG extended release capsule Take 1 capsule by mouth daily 22   Cecilia Austin MD       Current medications:    Current

## 2024-03-08 NOTE — PROGRESS NOTES
Phase II:  1.  Patient is identified using name and the date of birth.  2.  The patient is free from signs and symptoms of chemical, electrical, laser, radiation, positioning, or transfer/transport injury.  3.  The patient receives appropriate medication(s), safely administered during the Perioperative period.  4.  The patient has wound/tissue perfusion consistent with or improved from baseline levels established preoperatively.  5.  The patient is at or returning to normothermia at the conclusion of the immediate postoperative period.  6.  The patient's fluid, electrolyte, and acid base balances are consistent with or improved from baseline levels established preoperatively.  7.  The patient's pulmonary function is consistent with or improved from baseline levels established preoperatively.  8.  The patient's cardiovascular status is consistent with or improved from baseline levels established preoperatively.  9.  The patient/caregiver demonstrates knowledge of nutritional management related to the operative or other invasive procedure.  10.  The patient/caregiver demonstrates knowledge of medication, pain, and wound management.  11.  The patient participates in the rehabilitation process as applicable.  12.  The patient/caregiver participates in decisions affection his or her Perioperative plan of care.  13.  The patient's care is consistent with the individualized Perioperative plan of care.  14.  The patient's right to privacy is maintained.  15.  The patient is the recipient of competent and ethical care within legal standards of practice.  16.  The patient's value system, lifestyle, ethnicity, and culture are considered, respected, and incorporated in the Perioperative plan of care and understands special services available.  17.  The patient demonstrates and/or reports adequate pain control throughout the the Perioperative period.  18.  The patient's neurological status is consistent with or improved from  baseline levels established preoperatively.  19.  The patient/caregiver demonstrates knowledge of the expected responses to the operative or invasive procedure.  20.  Patient/Caregiver has reduced anxiety.  Interventions- Familiarize with environment and equipment.  21. Other:  22. Other:

## 2024-03-08 NOTE — ANESTHESIA POSTPROCEDURE EVALUATION
Department of Anesthesiology  Postprocedure Note    Patient: Gifty Marcum  MRN: [id number]  YOB: 1980  Date of evaluation: 3/8/2024    Procedure Summary       Date: 03/08/24 Room / Location: [address]    Anesthesia Start: 0941 Anesthesia Stop: 0957    Procedure: ESOPHAGOGASTRODUODENOSCOPY BIOPSY Diagnosis:       Gastroesophageal reflux disease, unspecified whether esophagitis present      (Gastroesophageal reflux disease, unspecified whether esophagitis present [K21.9])    Surgeons: Jelani Centeno MD Responsible Provider: Adi Bradley MD    Anesthesia Type: TIVA ASA Status: 2            Anesthesia Type: No value filed.    Andres Phase I: Andres Score: 10    Andres Phase II: Andres Score: 10    Anesthesia Post Evaluation    Patient location during evaluation: bedside  Patient participation: complete - patient participated  Level of consciousness: awake and alert  Airway patency: patent  Nausea & Vomiting: no nausea  Cardiovascular status: hemodynamically stable  Respiratory status: acceptable  Hydration status: euvolemic  Pain management: adequate      Vitals:    02/29/24 1136 03/08/24 0914 03/08/24 0955   BP:  137/87 123/81   Pulse:  81 73   Resp:  18 16   Temp:  97.9 °F (36.6 °C) 97.5 °F (36.4 °C)   TempSrc:  Infrared Temporal   SpO2:  98% 96%   Weight: 88.5 kg (195 lb)     Height: 1.74 m (5' 8.5\")        No notable events documented.

## (undated) DEVICE — ENDOSCOPIC KIT 2 12 FT OP4 DE2 GWN SYR

## (undated) DEVICE — CONMED SCOPE SAVER BITE BLOCK, 20X27 MM: Brand: SCOPE SAVER

## (undated) DEVICE — FORCEP BX STD CAP 240CM RAD JAW 4

## (undated) DEVICE — YANKAUER,BULB TIP,W/O VENT,RIGID,STERILE: Brand: MEDLINE

## (undated) DEVICE — CANNULA,OXY,ADULT,SUPERSOFT,W/7'TUB,SC: Brand: MEDLINE INDUSTRIES, INC.

## (undated) DEVICE — ELECTRODE ECG MONITR FOAM TEAR DROP ADLT RED